# Patient Record
Sex: FEMALE | Race: OTHER | Employment: UNEMPLOYED | URBAN - METROPOLITAN AREA
[De-identification: names, ages, dates, MRNs, and addresses within clinical notes are randomized per-mention and may not be internally consistent; named-entity substitution may affect disease eponyms.]

---

## 2017-04-11 DIAGNOSIS — M05.79 RHEUMATOID ARTHRITIS INVOLVING MULTIPLE SITES WITH POSITIVE RHEUMATOID FACTOR (HCC): ICD-10-CM

## 2017-04-11 DIAGNOSIS — I01.1 RHEUMATOID AORTITIS: ICD-10-CM

## 2017-04-11 RX ORDER — FOLIC ACID 1 MG/1
1 TABLET ORAL DAILY
Qty: 30 TAB | Refills: 11 | Status: SHIPPED | OUTPATIENT
Start: 2017-04-11

## 2017-04-11 RX ORDER — METHOTREXATE 2.5 MG/1
17.5 TABLET ORAL
Qty: 24 TAB | Refills: 0 | Status: SHIPPED | OUTPATIENT
Start: 2017-04-12

## 2017-04-11 NOTE — TELEPHONE ENCOUNTER
Patient is calling the [address] [place] [address] office - trying to get refill in [place] for medications. Her hands and feet are in pain.

## 2017-04-11 NOTE — TELEPHONE ENCOUNTER
Cherie Mitchell, , spoke to the patient, she would like refills of Folic Acid and Methotrexate. Explained that we will not be able to continue to prescribe to her is she is not followed by our providers. Pt said she will make an appointment, she is talking to registrar to make appt. Told her we will forward the request to provider but cannot guarantee that Rx's will be filled.   Forwarding to Dr Bijan Diaz and Dr Joy Burton

## 2017-04-11 NOTE — TELEPHONE ENCOUNTER
DAMEONO pt calling for refill from [place]. Routing to [address] [facility] [address] and Dr Prince Espinal for review.

## 2017-06-09 ENCOUNTER — TELEPHONE (OUTPATIENT)
Dept: FAMILY MEDICINE CLINIC | Age: 52
End: 2017-06-09

## 2017-06-09 DIAGNOSIS — M05.79 RHEUMATOID ARTHRITIS INVOLVING MULTIPLE SITES WITH POSITIVE RHEUMATOID FACTOR (HCC): ICD-10-CM

## 2017-06-09 RX ORDER — METHOTREXATE 2.5 MG/1
17.5 TABLET ORAL
Qty: 24 TAB | Refills: 0 | OUTPATIENT
Start: 2017-06-14

## 2017-06-09 NOTE — TELEPHONE ENCOUNTER
Incoming fax request received [facility] pharmacy for methotrexate 2.5mg tablet, lov 10/28/16, pt lives in [place], regular patient, however f/s  in 2017. Tel call placed to patient to have her make a f/s appt to renew, spoke to pt daughter on PHI, she will have pt call to make the appt.

## 2017-06-09 NOTE — TELEPHONE ENCOUNTER
[facility] left [facility] requesting refill for methotrexate for this patient. I returned the call and asked pharmacy to fax request to [facility].     Bharath Jackson April

## 2017-06-09 NOTE — TELEPHONE ENCOUNTER
Patient has been left a message through her daughter that she needs f/s renewal first in order to receive medication as per Jose J Reyez NP.

## 2017-06-12 ENCOUNTER — TELEPHONE (OUTPATIENT)
Dept: FAMILY MEDICINE CLINIC | Age: 52
End: 2017-06-12

## 2017-06-12 NOTE — TELEPHONE ENCOUNTER
[facility] called about refill for Methotrexate. Read not to pharmacist that patient needs to be seen by a provider.     Mesfin Berman April

## 2017-06-19 NOTE — TELEPHONE ENCOUNTER
Yes, I am unable to refill any meds until she comes in. Can make 1 more appt. Beyond that, will need to come in as a walk-in to [facility]. If she continues to work in [place], we could help her find a free clinic there prn.

## 2017-06-21 NOTE — TELEPHONE ENCOUNTER
Contacted [facility] pharmacy to advise them Methotrexate Rx will not be filled until pt is seen. Attempting to contact the patient.

## 2017-06-21 NOTE — TELEPHONE ENCOUNTER
Krystle Marino  spoke to the patient, advised her that as she had been previously told we cannot continue to fill her meds unless she is seen. Suggested she either reschedule the f/s and appt she did not come to at [facility], or go to the [place] [address] to be seen if she can't find a clinic in [place] that she can use for Primary Care.   Pt said she would either go to the [facility] or call [facility] for appointment